# Patient Record
Sex: FEMALE
[De-identification: names, ages, dates, MRNs, and addresses within clinical notes are randomized per-mention and may not be internally consistent; named-entity substitution may affect disease eponyms.]

---

## 2018-02-02 ENCOUNTER — HOSPITAL ENCOUNTER (OUTPATIENT)
Dept: HOSPITAL 5 - SPVWC | Age: 63
Discharge: HOME | End: 2018-02-02
Attending: FAMILY MEDICINE
Payer: COMMERCIAL

## 2018-02-02 DIAGNOSIS — Z12.31: Primary | ICD-10-CM

## 2018-02-02 PROCEDURE — 77067 SCR MAMMO BI INCL CAD: CPT

## 2018-02-05 NOTE — MAMMOGRAPHY REPORT
BILATERAL DIGITAL SCREENING MAMMOGRAM with CAD: 02/02/18 08:10:00



CLINICAL: Routine screening.



COMPARISON:01/30/15, 08/09/13 and additional mammograms going back to 

2005.



FINDINGS: The breasts are heterogeneously dense, which may obscure 

small masses.  Right retroareolar asymmetry on the CC view is slightly 

change compared to prior mammograms and requires additional imaging.No 

architectural distortion or suspicious calcifications.The left breast 

is negative.



IMPRESSION: Right asymmetry requiring further workup.



BI-RADS CATEGORY:  0 -- Additional Imaging Evaluation Required



RECOMMENDATION: Recall for right true lateral and spot magnification CC 

views and right breast ultrasound if needed.



ACR BI-RADS MAMMOGRAPHIC CODES:

0 = Needs additional imaging evaluation; 1 = Negative; 2 = Benign; 3 = 

Probably benign; 4 = Suspicious; 5 = Malignant; 6 = Known biopsy-proven 

malignancy



COMMENT:

      1.   Dense breast tissue, i.e., adenosis, fibrocystic 

            changes, etc., may obscure an underlying neoplasm.

      2.   Approximately 10% of cancers are not detected with

            mammography.

      3.   A negative mammography report should not delay biopsy 

            if a clinically suspicious mass is present.



COMMENT:

Patient follow-up letters are generated via our [employer] application.

## 2018-02-14 ENCOUNTER — HOSPITAL ENCOUNTER (OUTPATIENT)
Dept: HOSPITAL 5 - SPVWC | Age: 63
Discharge: HOME | End: 2018-02-14
Attending: FAMILY MEDICINE
Payer: COMMERCIAL

## 2018-02-14 DIAGNOSIS — N63.10: Primary | ICD-10-CM

## 2018-02-14 DIAGNOSIS — R92.8: ICD-10-CM

## 2018-02-14 NOTE — MAMMOGRAPHY REPORT
RIGHT DIGITAL DIAGNOSTIC MAMMOGRAM and RIGHT BREAST ULTRASOUND: 

02/14/18 13:27:00



CLINICAL: Recalled for asymmetry.



COMPARISON:02/02/18 screening



FINDINGS: Lateralmedial and spot magnification LM, MLO and CC views 

were performed. Partial effacement of the retroareolar asymmetry. A few 

scattered punctate calcifications are identified within and outside of 

the asymmetry. 



Ultrasound of the right breast (including all four quadrants and the 

retroareolar area) was performed and demonstrated an irregular 

hypoechoic shadowing retroareolar mass at 12 o'clock.  It measures 

approximately 2.7 x 2.0 x 1.5 cm and correlates with mammographic 

asymmetry.  No other mass identified.  Ultrasound of the right axilla 

demonstrated no suspicious lymph node.



IMPRESSION: A 2.7 cm right retroareolar mass.



BI-RADS CATEGORY:  4--Suspicious



RECOMMENDATION: Ultrasound guided needle core biopsy of the right 

breast.



I discussed the findings and the recommendation (for a needle core 

biopsy of the right breast) with the patient at the time of the 

examination.



ACR BI-RADS MAMMOGRAPHIC CODES:

0 = Needs additional imaging evaluation; 1 = Negative; 2 = Benign; 3 = 

Probably benign; 4 = Suspicious; 5 = Malignant; 6 = Known biopsy-proven 

malignancy



COMMENT:

      1.   Dense breast tissue, i.e., adenosis, fibrocystic 

            changes, etc., may obscure an underlying neoplasm.

      2.   Approximately 10% of cancers are not detected with

            mammography.

      3.   A negative mammography report should not delay biopsy 

            if a clinically suspicious mass is present.





COMMENT:

Patient follow-up letters are generated via our [employer] 

application.

## 2018-02-14 NOTE — ULTRASOUND REPORT
RIGHT DIGITAL DIAGNOSTIC MAMMOGRAM and RIGHT BREAST ULTRASOUND: 

02/14/18 13:27:00



CLINICAL: Recalled for asymmetry.



COMPARISON:02/02/18 screening



FINDINGS: Lateralmedial and spot magnification LM, MLO and CC views 

were performed. Partial effacement of the retroareolar asymmetry. A few 

scattered punctate calcifications are identified within and outside of 

the asymmetry. 



Ultrasound of the right breast (including all four quadrants and the 

retroareolar area) was performed and demonstrated an irregular 

hypoechoic shadowing retroareolar mass at 12 o'clock.  It measures 

approximately 2.7 x 2.0 x 1.5 cm and correlates with mammographic 

asymmetry.  No other mass identified.  Ultrasound of the right axilla 

demonstrated no suspicious lymph node.



IMPRESSION: A 2.7 cm right retroareolar mass.



BI-RADS CATEGORY:  4--Suspicious



RECOMMENDATION: Ultrasound guided needle core biopsy of the right 

breast.



I discussed the findings and the recommendation (for a needle core 

biopsy of the right breast) with the patient at the time of the 

examination.



ACR BI-RADS MAMMOGRAPHIC CODES:

0 = Needs additional imaging evaluation; 1 = Negative; 2 = Benign; 3 = 

Probably benign; 4 = Suspicious; 5 = Malignant; 6 = Known biopsy-proven 

malignancy



COMMENT:

      1.   Dense breast tissue, i.e., adenosis, fibrocystic 

            changes, etc., may obscure an underlying neoplasm.

      2.   Approximately 10% of cancers are not detected with

            mammography.

      3.   A negative mammography report should not delay biopsy 

            if a clinically suspicious mass is present.





COMMENT:

Patient follow-up letters are generated via our [employer] 

application.

## 2018-02-27 ENCOUNTER — HOSPITAL ENCOUNTER (OUTPATIENT)
Dept: HOSPITAL 5 - SPVWC | Age: 63
Discharge: HOME | End: 2018-02-27
Attending: FAMILY MEDICINE
Payer: COMMERCIAL

## 2018-02-27 DIAGNOSIS — N63.10: Primary | ICD-10-CM

## 2018-02-27 PROCEDURE — 88305 TISSUE EXAM BY PATHOLOGIST: CPT

## 2018-02-27 NOTE — MAMMOGRAPHY REPORT
RIGHT DIGITAL DIAGNOSTIC MAMMOGRAM: 02/27/18 10:26:00



CLINICAL: For clip placement status post ultrasound biopsy.



COMPARISON:02/14/18



FINDINGS: A retroareolar biopsy clip is now identified and correlates 

with the previously described mammographic asymmetry. 





IMPRESSION: Concordant clip placement status post ultrasound biopsy.



BI-RADS CATEGORY:  4--Suspicious



Pathology pending.